# Patient Record
(demographics unavailable — no encounter records)

---

## 2024-10-15 NOTE — HISTORY OF PRESENT ILLNESS
[de-identified] : 50-year-old female pain discomfort in her right thumb.  Comes in today for evaluation.  She was at work on September 26, 2024 felt pain discomfort in the right thumb she comes in for the evaluation today still complaining about the pain discomfort in the thumb.  She was in a cast for the possibility of a fracture she took the cast off and she is in a brace she currently is not working and is totally disabled

## 2024-10-15 NOTE — ASSESSMENT
[FreeTextEntry1] : I think the patient has a sprain of her right thumb.  MRI is indicated for further evaluation.  She will continue with her brace.  Gentle range of motion exercises.  No work.  See me back in the next 4 to 6 weeks.

## 2024-10-15 NOTE — PHYSICAL EXAM
[de-identified] : Patient is removed from immobilization patient is stable to valgus stress.  Patient has no erythema ecchymoses or abrasions.  She is tender to palpation along the thumb MP joint.

## 2024-10-15 NOTE — DATA REVIEWED
[FreeTextEntry1] : Radiographs 3 views of the right thumb are reviewed today in the office showing no fracture dislocation destructive lesions

## 2024-11-01 NOTE — ASSESSMENT
[FreeTextEntry1] : Patient is a sprain of the right thumb.  The plan will be return back to work activities full duty on November 12.  From the time she was hurt so she goes back to work she would have been totally disabled.  She has pending shoulder surgery.  She will check back in with us in about 2 months.

## 2024-11-01 NOTE — PHYSICAL EXAM
[de-identified] : Patient is stable to valgus and varus stress.  Good early range of motion of the thumb.  No erythema ecchymoses or abrasions.  No masses.  No erythema.

## 2024-11-01 NOTE — HISTORY OF PRESENT ILLNESS
[de-identified] : 50-year-old female right-sided thumb pain discomfort MRI showed no structural abnormalities.  Comes in for evaluation today.  Currently not working.

## 2024-11-11 NOTE — REVIEW OF SYSTEMS
[Negative] : Gastrointestinal [Blurry Vision] : no blurred vision [Dyspnea on exertion] : not dyspnea during exertion [Chest Discomfort] : no chest discomfort [Lower Ext Edema] : no extremity edema [Leg Claudication] : no intermittent leg claudication [Palpitations] : no palpitations [Syncope] : syncope [Dizziness] : dizziness [Anxiety] : anxiety

## 2024-11-11 NOTE — HISTORY OF PRESENT ILLNESS
[FreeTextEntry1] : 51-year-old female PMH: anxiety, hypothyroidism, thyroid nodules, vasovagal  PCP:  Dr. Nesbitt  Patient present for cardiac evaluation. Denies chest pain, shortness of breath, dizziness, or LE swelling. Reports palpitations with increased anxiety. Reports history of vasovagal, reports last syncope about 7 months ago. Reports she has these syncope episodes since she was 8 years ago. No recent hospitalizations. Patient having right rotator cuff surgery on 11/20 with Dr. Lechuga  10/24/24 GFR 74

## 2024-12-03 NOTE — HISTORY OF PRESENT ILLNESS
[de-identified] : Pt is s/p right shoulder arthroscopy Doing well. Pain controlled  NAD Right shoulder Incisions healed Mild diffuse TTP Compartments soft and NT ROM limited secondary to pain NVI  s/p right shoulder arthroscopy went over the surgery in detail will start pt, protocol provided continue pain control as needed fu in 1 month all questions answered

## 2024-12-03 NOTE — HISTORY OF PRESENT ILLNESS
[de-identified] : Pt is s/p right shoulder arthroscopy Doing well. Pain controlled  NAD Right shoulder Incisions healed Mild diffuse TTP Compartments soft and NT ROM limited secondary to pain NVI  s/p right shoulder arthroscopy went over the surgery in detail will start pt, protocol provided continue pain control as needed fu in 1 month all questions answered

## 2025-01-16 NOTE — DISCUSSION/SUMMARY
[de-identified] : Right shoulder pain follow-up  HPI Patient is a 51-year-old female reports to office for subsequent evaluation of her right shoulder pain.  This is her second postoperative visit.  She had a right shoulder arthroscopy/extensive debridement and open biceps tenodesis done on 11/20/2024 by Dr. Lechuga.  She still having pain in her right shoulder.  Lifting, certain range of motion, palpating certain areas aggravate the patient's pain.  She is right-hand dominant.  She has been undergoing formal physical therapy which has been helping with her range of motion slightly.  Denies any numbness or tingling.  Right shoulder exam is as follows: No swelling noted.  No erythema or ecchymosis.  Well-healed incision sites.  Active forward flexion and to approximately 130 degrees, passive forward flexion to approximately 175 degrees with stiffness and pain.  Active abduction to approximately 100 degrees, passive abduction to approximately 120 degrees with stiffness and pain.  Internal rotation to L5 and external rotation to 85 degrees with stiffness and pain.  Strength is 4/5 with discomfort.  Light touch intact throughout.  Neurovascular intact.  Assessment/plan Patient is still in pain status post surgery.  Explained that this will take time to fully heal and for her to regain her full motion and strength.  New PT prescription provided so she may continue that as directed.  Mobic 15 mg PO QD PRN was sent to patient's pharmacy to help alleviate their symptoms.  The patient was advised to rest/ice the area and may alternate with warm compresses as needed.  She kindly declined a cortisone injection today.  Follow-up in 6 weeks.  All questions/concerns were answered in detail.

## 2025-02-27 NOTE — HISTORY OF PRESENT ILLNESS
[de-identified] : Pt is s/p right shoulder arthroscopy still having pain and soreness  NAD Right shoulder Incisions healed Mild diffuse TTP Compartments soft and NT  NVI  s/p right shoulder arthroscopy went over the surgery in detail new pt script provided, this is medically necessary for rom continue pain control as needed fu in 2 month all questions answered

## 2025-03-28 NOTE — HISTORY OF PRESENT ILLNESS
[de-identified] : Pt is s/p right shoulder arthroscopy still having pain and soreness  NAD Right shoulder Incisions healed Mild diffuse TTP Compartments soft and NT  NVI  s/p right shoulder arthroscopy went over the surgery in detail new pt script provided, this is medically necessary for rom continue pain control as needed fu in 2 month all questions answered

## 2025-05-08 NOTE — DISCUSSION/SUMMARY
[de-identified] : Right shoulder pain follow-up  HPI Patient is a 51-year-old female reports to office for subsequent evaluation of her right shoulder pain. She had a right shoulder arthroscopy/extensive debridement and open biceps tenodesis done on 11/20/2024 by Dr. Lechuga. She still having pain in her right shoulder. Lifting, certain range of motion, palpating certain areas aggravate the patient's pain. She is right-hand dominant. She has been undergoing formal physical therapy which has been helping with her range of motion.  She still admits to mild shoulder weakness.  She is right-hand dominant.  Denies any numbness or tingling.  Right shoulder exam is as follows: No swelling noted. No erythema or ecchymosis. Well-healed incision sites. Active forward flexion/abduction/abduction and to approximately 170 degrees, passive forward flexion to approximately 175 degrees with stiffness and pain. Internal rotation to sacrum and external rotation to 90 degrees with stiffness.  There is mild decrease in strength but with improvement. Light touch intact throughout. Neurovascular intact.  Assessment/plan Explained to patient that her pain is clinically arthritic in nature status post shoulder arthroscopy.  She is in need of more PT to regain her ROM and strength.  New PT prescription provided so she may continue that as directed.  The shoulder conditioning program from the [facility] was given to the patient so they may try that at home.  She kindly declined a cortisone injection today.  She may continue OTC NSAIDs as needed for pain.  Patient has already returned back to work full duty and will continue to do so.  Follow-up on as-needed basis.  All question/concerns were answered in detail.

## 2025-06-30 NOTE — ASSESSMENT
[FreeTextEntry1] : Patient has sprain of the right thumb.  Because of the motion loss that she has in her thumb which is mild.  She has a 5% loss of use of the thumb.  No other interventions is necessary.  On an as-needed basis.

## 2025-06-30 NOTE — HISTORY OF PRESENT ILLNESS
[de-identified] : 51-year-old female had an injury to her right thumb with September 26, 2024.  He is at [facility].  She is working.

## 2025-06-30 NOTE — PHYSICAL EXAM
[de-identified] : Patient has tenderness around the MP joint.  This is of the right thumb.  No instability different from the opposite thumb.  Her range of motion of the right thumb tested passively with a goniometer is 80 degrees at the IP joint of the thumb and C5 degrees at the MP joint.  On the uninjured left thumb she has 90 degrees at the IP joint and 70 degrees at the MP joint